# Patient Record
Sex: MALE | Race: WHITE | NOT HISPANIC OR LATINO | Employment: FULL TIME | URBAN - NONMETROPOLITAN AREA
[De-identification: names, ages, dates, MRNs, and addresses within clinical notes are randomized per-mention and may not be internally consistent; named-entity substitution may affect disease eponyms.]

---

## 2021-06-01 ENCOUNTER — HOSPITAL ENCOUNTER (EMERGENCY)
Facility: OTHER | Age: 41
Discharge: HOME | End: 2021-06-01
Attending: PHYSICIAN ASSISTANT | Admitting: PHYSICIAN ASSISTANT
Payer: COMMERCIAL

## 2021-06-01 ENCOUNTER — APPOINTMENT (OUTPATIENT)
Dept: CT IMAGING | Facility: OTHER | Age: 41
End: 2021-06-01
Attending: PHYSICIAN ASSISTANT
Payer: COMMERCIAL

## 2021-06-01 VITALS
BODY MASS INDEX: 27.16 KG/M2 | HEIGHT: 77 IN | TEMPERATURE: 100.9 F | DIASTOLIC BLOOD PRESSURE: 74 MMHG | OXYGEN SATURATION: 94 % | HEART RATE: 103 BPM | RESPIRATION RATE: 9 BRPM | WEIGHT: 230 LBS | SYSTOLIC BLOOD PRESSURE: 132 MMHG

## 2021-06-01 DIAGNOSIS — Z20.822 SUSPECTED COVID-19 VIRUS INFECTION: ICD-10-CM

## 2021-06-01 DIAGNOSIS — R05.9 COUGH: ICD-10-CM

## 2021-06-01 DIAGNOSIS — U07.1 2019 NOVEL CORONAVIRUS DISEASE (COVID-19): ICD-10-CM

## 2021-06-01 DIAGNOSIS — R06.02 SOB (SHORTNESS OF BREATH): ICD-10-CM

## 2021-06-01 LAB
ANION GAP SERPL CALCULATED.3IONS-SCNC: 11 MMOL/L (ref 3–14)
BASOPHILS # BLD AUTO: 0 10E9/L (ref 0–0.2)
BASOPHILS NFR BLD AUTO: 0.3 %
BUN SERPL-MCNC: 16 MG/DL (ref 7–25)
CALCIUM SERPL-MCNC: 8.2 MG/DL (ref 8.6–10.3)
CHLORIDE SERPL-SCNC: 98 MMOL/L (ref 98–107)
CO2 SERPL-SCNC: 25 MMOL/L (ref 21–31)
CREAT SERPL-MCNC: 1.23 MG/DL (ref 0.7–1.3)
D DIMER PPP FEU-MCNC: 1.2 UG/ML FEU (ref 0–0.5)
DIFFERENTIAL METHOD BLD: ABNORMAL
EOSINOPHIL # BLD AUTO: 0 10E9/L (ref 0–0.7)
EOSINOPHIL NFR BLD AUTO: 0 %
ERYTHROCYTE [DISTWIDTH] IN BLOOD BY AUTOMATED COUNT: 11.9 % (ref 10–15)
GFR SERPL CREATININE-BSD FRML MDRD: 65 ML/MIN/{1.73_M2}
GLUCOSE SERPL-MCNC: 99 MG/DL (ref 70–105)
HCT VFR BLD AUTO: 40.2 % (ref 40–53)
HGB BLD-MCNC: 14.3 G/DL (ref 13.3–17.7)
IMM GRANULOCYTES # BLD: 0 10E9/L (ref 0–0.4)
IMM GRANULOCYTES NFR BLD: 0.5 %
INTERPRETATION ECG - MUSE: NORMAL
LABORATORY COMMENT REPORT: ABNORMAL
LYMPHOCYTES # BLD AUTO: 0.4 10E9/L (ref 0.8–5.3)
LYMPHOCYTES NFR BLD AUTO: 10.8 %
MCH RBC QN AUTO: 29.6 PG (ref 26.5–33)
MCHC RBC AUTO-ENTMCNC: 35.6 G/DL (ref 31.5–36.5)
MCV RBC AUTO: 83 FL (ref 78–100)
MONOCYTES # BLD AUTO: 0.3 10E9/L (ref 0–1.3)
MONOCYTES NFR BLD AUTO: 7.5 %
NEUTROPHILS # BLD AUTO: 3.2 10E9/L (ref 1.6–8.3)
NEUTROPHILS NFR BLD AUTO: 80.9 %
NT-PROBNP SERPL-MCNC: 11 PG/ML (ref 0–100)
PLATELET # BLD AUTO: 232 10E9/L (ref 150–450)
POTASSIUM SERPL-SCNC: 3.6 MMOL/L (ref 3.5–5.1)
RBC # BLD AUTO: 4.83 10E12/L (ref 4.4–5.9)
SARS-COV-2 RNA RESP QL NAA+PROBE: POSITIVE
SODIUM SERPL-SCNC: 134 MMOL/L (ref 134–144)
SPECIMEN SOURCE: ABNORMAL
TROPONIN I SERPL-MCNC: 4.2 PG/ML
WBC # BLD AUTO: 3.9 10E9/L (ref 4–11)

## 2021-06-01 PROCEDURE — 80048 BASIC METABOLIC PNL TOTAL CA: CPT | Performed by: PHYSICIAN ASSISTANT

## 2021-06-01 PROCEDURE — 36415 COLL VENOUS BLD VENIPUNCTURE: CPT | Performed by: PHYSICIAN ASSISTANT

## 2021-06-01 PROCEDURE — 99285 EMERGENCY DEPT VISIT HI MDM: CPT | Mod: 25 | Performed by: PHYSICIAN ASSISTANT

## 2021-06-01 PROCEDURE — 93010 ELECTROCARDIOGRAM REPORT: CPT | Performed by: INTERNAL MEDICINE

## 2021-06-01 PROCEDURE — U0003 INFECTIOUS AGENT DETECTION BY NUCLEIC ACID (DNA OR RNA); SEVERE ACUTE RESPIRATORY SYNDROME CORONAVIRUS 2 (SARS-COV-2) (CORONAVIRUS DISEASE [COVID-19]), AMPLIFIED PROBE TECHNIQUE, MAKING USE OF HIGH THROUGHPUT TECHNOLOGIES AS DESCRIBED BY CMS-2020-01-R: HCPCS | Performed by: PHYSICIAN ASSISTANT

## 2021-06-01 PROCEDURE — 83880 ASSAY OF NATRIURETIC PEPTIDE: CPT | Performed by: PHYSICIAN ASSISTANT

## 2021-06-01 PROCEDURE — 250N000011 HC RX IP 250 OP 636: Performed by: PHYSICIAN ASSISTANT

## 2021-06-01 PROCEDURE — 85379 FIBRIN DEGRADATION QUANT: CPT | Performed by: PHYSICIAN ASSISTANT

## 2021-06-01 PROCEDURE — 99283 EMERGENCY DEPT VISIT LOW MDM: CPT | Performed by: PHYSICIAN ASSISTANT

## 2021-06-01 PROCEDURE — 96374 THER/PROPH/DIAG INJ IV PUSH: CPT | Mod: XU | Performed by: PHYSICIAN ASSISTANT

## 2021-06-01 PROCEDURE — 93005 ELECTROCARDIOGRAM TRACING: CPT | Performed by: PHYSICIAN ASSISTANT

## 2021-06-01 PROCEDURE — 250N000013 HC RX MED GY IP 250 OP 250 PS 637: Performed by: PHYSICIAN ASSISTANT

## 2021-06-01 PROCEDURE — U0005 INFEC AGEN DETEC AMPLI PROBE: HCPCS | Performed by: PHYSICIAN ASSISTANT

## 2021-06-01 PROCEDURE — C9803 HOPD COVID-19 SPEC COLLECT: HCPCS | Performed by: PHYSICIAN ASSISTANT

## 2021-06-01 PROCEDURE — 84484 ASSAY OF TROPONIN QUANT: CPT | Performed by: PHYSICIAN ASSISTANT

## 2021-06-01 PROCEDURE — 258N000003 HC RX IP 258 OP 636: Performed by: PHYSICIAN ASSISTANT

## 2021-06-01 PROCEDURE — 85025 COMPLETE CBC W/AUTO DIFF WBC: CPT | Performed by: PHYSICIAN ASSISTANT

## 2021-06-01 PROCEDURE — 71275 CT ANGIOGRAPHY CHEST: CPT

## 2021-06-01 RX ORDER — DEXAMETHASONE SODIUM PHOSPHATE 10 MG/ML
6 INJECTION, SOLUTION INTRAMUSCULAR; INTRAVENOUS ONCE
Status: COMPLETED | OUTPATIENT
Start: 2021-06-01 | End: 2021-06-01

## 2021-06-01 RX ORDER — IOPAMIDOL 755 MG/ML
100 INJECTION, SOLUTION INTRAVASCULAR ONCE
Status: COMPLETED | OUTPATIENT
Start: 2021-06-01 | End: 2021-06-01

## 2021-06-01 RX ORDER — ACETAMINOPHEN 500 MG
500 TABLET ORAL ONCE
Status: COMPLETED | OUTPATIENT
Start: 2021-06-01 | End: 2021-06-01

## 2021-06-01 RX ORDER — GUAIFENESIN/DEXTROMETHORPHAN 100-10MG/5
5 SYRUP ORAL 3 TIMES DAILY PRN
Qty: 118 ML | Refills: 0 | Status: SHIPPED | OUTPATIENT
Start: 2021-06-01

## 2021-06-01 RX ADMIN — IOPAMIDOL 100 ML: 755 INJECTION, SOLUTION INTRAVENOUS at 11:51

## 2021-06-01 RX ADMIN — DEXAMETHASONE SODIUM PHOSPHATE 6 MG: 10 INJECTION, SOLUTION INTRAMUSCULAR; INTRAVENOUS at 12:48

## 2021-06-01 RX ADMIN — SODIUM CHLORIDE 1000 ML: 9 INJECTION, SOLUTION INTRAVENOUS at 11:22

## 2021-06-01 RX ADMIN — ACETAMINOPHEN 500 MG: 500 TABLET, FILM COATED ORAL at 12:45

## 2021-06-01 ASSESSMENT — ENCOUNTER SYMPTOMS
WOUND: 0
CONFUSION: 0
SHORTNESS OF BREATH: 1
FATIGUE: 1
ADENOPATHY: 0
BACK PAIN: 0
BRUISES/BLEEDS EASILY: 0
COUGH: 1
ABDOMINAL PAIN: 0
CHEST TIGHTNESS: 0
CHILLS: 0
HEMATURIA: 0
FEVER: 1

## 2021-06-01 ASSESSMENT — MIFFLIN-ST. JEOR: SCORE: 2065.65

## 2021-06-01 NOTE — PROGRESS NOTES
1.  Has the patient had a previous reaction to IV contrast? no    2.  Does the patient have kidney disease? n    3.  Is the patient on dialysis? no    If YES to any of these questions, exam will be reviewed with a Radiologist before administering contrast.    IV Contrast- Discharge Instructions After Your CT Scan      The IV contrast you received today will be filtered from your bloodstream by your kidneys during the next 24 hours and pass from the body in urine.  You will not be aware of this process and your urine will not change in color.  To help this process you should drink at least 4 additional glasses of water or juice today.  This reduces stress on your kidneys.    Most contrast reactions are immediate.  Should you develop symptoms of concern after discharge, contact the department at the number below.  After hours you should contact your personal physician.  If you develop breathing distress or wheezing, call 911.

## 2021-06-01 NOTE — ED PROVIDER NOTES
"  History     Chief Complaint   Patient presents with     Shortness of Breath     Cough     Nausea & Vomiting     Fever     HPI  Salbador Chavis is a 40 year old male who presents to the ED for evaluation of sob, cough, n/v, fever for 1 week. Patient states he was around a coworker that was confirmed covid positive about 15 days ago. Patient has been self quarantining at home but has not been tested for covid yet. Still drinking fluids, otherwise healthy.     Allergies:  No Known Allergies    Problem List:    There are no active problems to display for this patient.       Past Medical History:    History reviewed. No pertinent past medical history.    Past Surgical History:    History reviewed. No pertinent surgical history.    Family History:    History reviewed. No pertinent family history.    Social History:  Marital Status:   [2]  Social History     Tobacco Use     Smoking status: Never Smoker     Smokeless tobacco: Never Used   Substance Use Topics     Alcohol use: None     Drug use: Not Currently        Medications:    guaiFENesin-dextromethorphan (ROBITUSSIN DM) 100-10 MG/5ML syrup          Review of Systems   Constitutional: Positive for fatigue and fever. Negative for chills.   HENT: Negative for congestion.    Eyes: Negative for visual disturbance.   Respiratory: Positive for cough and shortness of breath. Negative for chest tightness.    Cardiovascular: Negative for chest pain.   Gastrointestinal: Negative for abdominal pain.   Genitourinary: Negative for hematuria.   Musculoskeletal: Negative for back pain.   Skin: Negative for rash and wound.   Neurological: Negative for syncope.   Hematological: Negative for adenopathy. Does not bruise/bleed easily.   Psychiatric/Behavioral: Negative for confusion.       Physical Exam   BP: 120/79  Pulse: 114  Temp: 101.9  F (38.8  C)  Resp: 20  Height: 195.6 cm (6' 5\")  Weight: 104.3 kg (230 lb)  SpO2: 94 %      Physical Exam  Constitutional:       General: He " is not in acute distress.     Appearance: He is ill-appearing. He is not diaphoretic.   HENT:      Head: Normocephalic and atraumatic.   Eyes:      General: No scleral icterus.     Conjunctiva/sclera: Conjunctivae normal.   Neck:      Musculoskeletal: Neck supple.   Cardiovascular:      Rate and Rhythm: Normal rate and regular rhythm.   Pulmonary:      Effort: Pulmonary effort is normal.      Breath sounds: Normal breath sounds.   Abdominal:      Palpations: Abdomen is soft.      Tenderness: There is no abdominal tenderness.   Musculoskeletal:         General: No deformity.   Lymphadenopathy:      Cervical: No cervical adenopathy.   Skin:     General: Skin is warm and dry.      Findings: No rash.   Neurological:      Mental Status: He is alert and oriented to person, place, and time. Mental status is at baseline.   Psychiatric:         Mood and Affect: Mood normal.         Behavior: Behavior normal.         ED Course        Procedures          EKG read at 1059. , NSR, no ST changes.     Critical Care time:  none               Results for orders placed or performed during the hospital encounter of 06/01/21 (from the past 24 hour(s))   EKG 12 lead   Result Value Ref Range    Interpretation ECG Click View Image link to view waveform and result    Symptomatic SARS-CoV-2 COVID-19 Virus (Coronavirus) by PCR    Specimen: Nasopharyngeal   Result Value Ref Range    SARS-CoV-2 Virus Specimen Source Nasopharyngeal     SARS-CoV-2 PCR Result POSITIVE (AA)     SARS-CoV-2 PCR Comment       Testing was performed using the Xpert Xpress SARS-CoV-2 Assay on the Cepheid Gene-Xpert   Instrument Systems. Additional information about this Emergency Use Authorization (EUA)   assay can be found via the Lab Guide.     CT Chest Pulmonary Embolism w Contrast    Narrative    PROCEDURE: CT CHEST PULMONARY EMBOLISM W CONTRAST 6/1/2021 12:05 PM    HISTORY: Shortness of breath tachycardia cough    COMPARISONS: None.    Meds/Dose Given: isovue  370 100ml    TECHNIQUE: CT angiogram of the chest with sagittal coronal  reconstructions    FINDINGS: Widespread groundglass type opacities are seen throughout  both lungs highly suspicious for viral pneumonia. There are some  mildly enlarged hilar and subcarinal lymph nodes.    No pulmonary emboli are seen. The thoracic aorta is normal. The heart  is normal in size.    The upper portion of the liver has fatty infiltration. There is mild  splenomegaly. The pancreas appears normal.    The regional skeleton is intact.         Impression    IMPRESSION: Widespread predominantly groundglass opacities in both  lungs highly suspicious for viral pneumonia. No pulmonary emboli.    ALEX ANDERSON MD   CBC with platelets differential   Result Value Ref Range    WBC 3.9 (L) 4.0 - 11.0 10e9/L    RBC Count 4.83 4.4 - 5.9 10e12/L    Hemoglobin 14.3 13.3 - 17.7 g/dL    Hematocrit 40.2 40.0 - 53.0 %    MCV 83 78 - 100 fl    MCH 29.6 26.5 - 33.0 pg    MCHC 35.6 31.5 - 36.5 g/dL    RDW 11.9 10.0 - 15.0 %    Platelet Count 232 150 - 450 10e9/L    Diff Method Automated Method     % Neutrophils 80.9 %    % Lymphocytes 10.8 %    % Monocytes 7.5 %    % Eosinophils 0.0 %    % Basophils 0.3 %    % Immature Granulocytes 0.5 %    Absolute Neutrophil 3.2 1.6 - 8.3 10e9/L    Absolute Lymphocytes 0.4 (L) 0.8 - 5.3 10e9/L    Absolute Monocytes 0.3 0.0 - 1.3 10e9/L    Absolute Eosinophils 0.0 0.0 - 0.7 10e9/L    Absolute Basophils 0.0 0.0 - 0.2 10e9/L    Abs Immature Granulocytes 0.0 0 - 0.4 10e9/L   Basic metabolic panel   Result Value Ref Range    Sodium 134 134 - 144 mmol/L    Potassium 3.6 3.5 - 5.1 mmol/L    Chloride 98 98 - 107 mmol/L    Carbon Dioxide 25 21 - 31 mmol/L    Anion Gap 11 3 - 14 mmol/L    Glucose 99 70 - 105 mg/dL    Urea Nitrogen 16 7 - 25 mg/dL    Creatinine 1.23 0.70 - 1.30 mg/dL    GFR Estimate 65 >60 mL/min/[1.73_m2]    GFR Estimate If Black 78 >60 mL/min/[1.73_m2]    Calcium 8.2 (L) 8.6 - 10.3 mg/dL   Troponin GH    Result Value Ref Range    Troponin 4.2 <34.0 pg/mL   Nt probnp inpatient (BNP)   Result Value Ref Range    N-Terminal Pro BNP Inpatient 11 0 - 100 pg/mL   D dimer quantitative   Result Value Ref Range    D Dimer 1.2 (H) 0.0 - 0.50 ug/ml FEU       Medications   0.9% sodium chloride BOLUS (0 mLs Intravenous Stopped 6/1/21 1338)   iopamidol (ISOVUE-370) solution 100 mL (100 mLs Intravenous Given 6/1/21 1151)   dexamethasone PF (DECADRON) injection 6 mg (6 mg Intravenous Given 6/1/21 1248)   acetaminophen (TYLENOL) tablet 500 mg (500 mg Oral Given 6/1/21 1245)       Assessments & Plan (with Medical Decision Making)   Patient is ill-appearing but in no acute distress.  Heart, lung, bowel sounds are normal.  Appears soft nontender palpation, does have a heart rate of 103 and a temperature 100.9, otherwise stable vital signs.    He is positive for Covid, he has leukocytopenia, lymphocytopenia, D-dimer 1.2, troponin normal as is BNP.  BMP unremarkable.    CT PE study read as Widespread predominantly groundglass opacities in both  lungs highly suspicious for viral pneumonia. No pulmonary emboli.    Patient was given a dose of dexamethasone.  He is otherwise doing very well with stable vital signs and appears to be no acute respiratory distress.  Do not feel that he needs or would benefit from further hospitalization at this time.  He will continue with conservative treatment at home including Tylenol and ibuprofen, send him home with a get well loop information and with a finger oxygen saturation monitor and he is also given cough syrup.    Strict return precautions are given to the pt, they will return if symptoms are worsening or concerning. The pt understands and agrees with the plan and they are discharged.     Adi Dumont PA-C    I have reviewed the nursing notes.    I have reviewed the findings, diagnosis, plan and need for follow up with the patient.       Discharge Medication List as of 6/1/2021  1:39 PM       START taking these medications    Details   guaiFENesin-dextromethorphan (ROBITUSSIN DM) 100-10 MG/5ML syrup Take 5 mLs by mouth 3 times daily as needed for cough, Disp-118 mL, R-0, E-Prescribe             Final diagnoses:   2019 novel coronavirus disease (COVID-19)   Cough   SOB (shortness of breath)   Suspected COVID-19 virus infection       6/1/2021   [facility] PA  06/02/21 [id number]

## 2021-06-01 NOTE — DISCHARGE INSTRUCTIONS
Return if O2 saturations are consistently lower than 92%. Otherwise continue with conservative treatment at home, tylenol and ibuprofen every 4 hrs.

## 2021-06-01 NOTE — ED TRIAGE NOTES
"ED Nursing Triage Note (General)   ________________________________    Salbador Chavis is a 40 year old Male that presents to triage private car  With history of SOB, cough, n/v, and fevers that started around last Monday. Patient states he was around a coworker that was confirmed covid positive about 15 days ago. Patient has been self quarantining at home but has not been tested for covid yet. Current temp 101.9. Satting 94% via RA.     */79   Pulse 114   Temp 101.9  F (38.8  C) (Tympanic)   Resp 20   Ht 1.956 m (6' 5\")   Wt 104.3 kg (230 lb)   SpO2 94%   BMI 27.27 kg/m  t  Patient appears alert , in mild distress., and cooperative and calm behavior.    GCS Total = 15  Airway: intact  Breathing noted as Normal.  Circulation Normal  Skin normal  Action taken:  Triage to critical care immediately      PRE HOSPITAL PRIOR LIVING SITUATION Alone      "

## 2021-06-11 ENCOUNTER — PATIENT OUTREACH (OUTPATIENT)
Dept: CARE COORDINATION | Facility: CLINIC | Age: 41
End: 2021-06-11

## 2021-06-11 NOTE — PROGRESS NOTES
Clinic Care Coordination Contact  [facility]/Voicemail\    Referral received.  Patient seen in ED with Covid diagnosis. Get well loop given.     Clinical Data: Care Coordinator Outreach  Outreach attempted x 1.   no phone on file for patient  Plan: Care Coordinator will close encounter.  Unable to follow up without contact information. Has no visits scheduled here.   Joann Marte RN on 6/11/2021 at 3:03 PM

## (undated) RX ORDER — DEXAMETHASONE SODIUM PHOSPHATE 10 MG/ML
INJECTION, SOLUTION INTRAMUSCULAR; INTRAVENOUS
Status: DISPENSED
Start: 2021-06-01

## (undated) RX ORDER — ACETAMINOPHEN 500 MG
TABLET ORAL
Status: DISPENSED
Start: 2021-06-01

## (undated) RX ORDER — SODIUM CHLORIDE 9 MG/ML
INJECTION, SOLUTION INTRAVENOUS
Status: DISPENSED
Start: 2021-06-01